# Patient Record
Sex: MALE | Race: WHITE | NOT HISPANIC OR LATINO | Employment: FULL TIME | URBAN - METROPOLITAN AREA
[De-identification: names, ages, dates, MRNs, and addresses within clinical notes are randomized per-mention and may not be internally consistent; named-entity substitution may affect disease eponyms.]

---

## 2019-12-20 ENCOUNTER — OFFICE VISIT (OUTPATIENT)
Dept: URGENT CARE | Facility: CLINIC | Age: 29
End: 2019-12-20

## 2019-12-20 VITALS
HEIGHT: 68 IN | WEIGHT: 220 LBS | TEMPERATURE: 98 F | RESPIRATION RATE: 16 BRPM | DIASTOLIC BLOOD PRESSURE: 100 MMHG | SYSTOLIC BLOOD PRESSURE: 133 MMHG | HEART RATE: 85 BPM | OXYGEN SATURATION: 98 % | BODY MASS INDEX: 33.34 KG/M2

## 2019-12-20 DIAGNOSIS — S83.92XA SPRAIN OF LEFT KNEE, UNSPECIFIED LIGAMENT, INITIAL ENCOUNTER: Primary | ICD-10-CM

## 2019-12-20 DIAGNOSIS — Z02.6 ENCOUNTER RELATED TO WORKER'S COMPENSATION CLAIM: ICD-10-CM

## 2019-12-20 DIAGNOSIS — M25.562 ACUTE PAIN OF LEFT KNEE: ICD-10-CM

## 2019-12-20 PROCEDURE — 73562 X-RAY EXAM OF KNEE 3: CPT | Mod: FY,LT,S$GLB, | Performed by: RADIOLOGY

## 2019-12-20 PROCEDURE — 73562 XR KNEE 3 VIEW LEFT: ICD-10-PCS | Mod: FY,LT,S$GLB, | Performed by: RADIOLOGY

## 2019-12-20 PROCEDURE — 99203 OFFICE O/P NEW LOW 30 MIN: CPT | Mod: S$GLB,,, | Performed by: FAMILY MEDICINE

## 2019-12-20 PROCEDURE — 99203 PR OFFICE/OUTPT VISIT, NEW, LEVL III, 30-44 MIN: ICD-10-PCS | Mod: S$GLB,,, | Performed by: FAMILY MEDICINE

## 2019-12-20 RX ORDER — IBUPROFEN 800 MG/1
800 TABLET ORAL 3 TIMES DAILY
Qty: 30 TABLET | Refills: 0 | Status: SHIPPED | OUTPATIENT
Start: 2019-12-20 | End: 2019-12-30

## 2019-12-20 NOTE — LETTER
[facility]  [address]  [place] [address]  Phone: [phone]  Fax: [phone]  [facility]: [id number]    Pt Name: Esa Ahn  Injury Date: 12/20/2019   Employee ID:  Date Treatment: 12/20/2019   Company: [employer] to record [employer]       Appointment Time: 05:55 PM Arrived:6;10 pm    Provider: Henok Morgan MD Time Out: 8:15 pm     Office Treatment:   1. Sprain of left knee, unspecified ligament, initial encounter    2. Encounter related to worker's compensation claim    3. Acute pain of left knee      Medications Ordered This Encounter   Medications    ibuprofen (ADVIL,MOTRIN) 800 MG tablet                 Return Appointment: TBD  [address]   [id number],   [place] [id number]

## 2019-12-21 NOTE — PROGRESS NOTES
Subjective:       Patient ID: Esa Ahn is a 29 y.o. male.    Chief Complaint: Knee Pain    Pt c/o fall at work today at approx. 0930. Reports pain in LT knee and leg and lower back pain.    Knee Pain    The incident occurred 6 to 12 hours ago. The incident occurred at work. The injury mechanism was a fall. The pain is present in the left knee and left leg. The quality of the pain is described as aching. The pain is at a severity of 9/10. The pain has been fluctuating since onset. Associated symptoms include tingling. Pertinent negatives include no inability to bear weight, loss of motion, loss of sensation, muscle weakness or numbness. He reports no foreign bodies present. The symptoms are aggravated by movement. He has tried nothing for the symptoms.       Constitution: Negative for chills, fatigue and fever.   HENT: Negative for congestion and sore throat.    Neck: Negative for painful lymph nodes.   Cardiovascular: Negative for chest pain and leg swelling.   Eyes: Negative for double vision and blurred vision.   Respiratory: Negative for cough and shortness of breath.    Gastrointestinal: Negative for abdominal pain, nausea, vomiting, diarrhea and bowel incontinence.   Genitourinary: Negative for dysuria, frequency, urgency, bladder incontinence and hematuria.   Musculoskeletal: Positive for joint pain and back pain. Negative for joint swelling, muscle cramps, muscle ache and history of spine disorder.   Skin: Negative for color change, pale and rash.   Allergic/Immunologic: Negative for seasonal allergies.   Neurological: Negative for dizziness, history of vertigo, light-headedness, passing out, coordination disturbances, headaches, numbness and tingling.   Hematologic/Lymphatic: Negative for swollen lymph nodes, easy bruising/bleeding and history of blood clots. Does not bruise/bleed easily.   Psychiatric/Behavioral: Negative for nervous/anxious, sleep disturbance and depression. The patient is not  nervous/anxious.         Objective:      Physical Exam   Constitutional: He is oriented to person, place, and time. He appears well-developed and well-nourished. He is cooperative.  Non-toxic appearance. He does not appear ill. No distress.   HENT:   Head: Normocephalic and atraumatic.   Right Ear: Hearing, tympanic membrane and ear canal normal.   Left Ear: Hearing, tympanic membrane and ear canal normal.   Nose: No mucosal edema, rhinorrhea or nasal deformity. No epistaxis. Right sinus exhibits no maxillary sinus tenderness and no frontal sinus tenderness. Left sinus exhibits no maxillary sinus tenderness and no frontal sinus tenderness.   Mouth/Throat: Uvula is midline and mucous membranes are normal. No trismus in the jaw. Normal dentition. No uvula swelling. No posterior oropharyngeal erythema.   Eyes: Conjunctivae and lids are normal. Right eye exhibits no discharge. Left eye exhibits no discharge. No scleral icterus.   Sclera clear bilat   Neck: Trachea normal, normal range of motion, full passive range of motion without pain and phonation normal. Neck supple.   Cardiovascular: Normal rate, regular rhythm, normal heart sounds, intact distal pulses and normal pulses.   Pulmonary/Chest: Effort normal and breath sounds normal. No respiratory distress.   Abdominal: Soft. Normal appearance and bowel sounds are normal. He exhibits no distension and no pulsatile midline mass. There is no tenderness.   Musculoskeletal:        Legs:  Left knee has tenderness at the medial and lateral collateral ligament area.  No swelling, no redness, no bruise.  left knee is stable.   Neurological: He is alert and oriented to person, place, and time. He exhibits normal muscle tone. Coordination normal.   Skin: Skin is warm, dry and intact. He is not diaphoretic. No pallor.   Psychiatric: He has a normal mood and affect. His speech is normal and behavior is normal. Judgment and thought content normal. Cognition and memory are normal.    Nursing note and vitals reviewed.      Assessment:       1. Sprain of left knee, unspecified ligament, initial encounter    2. Encounter related to worker's compensation claim    3. Acute pain of left knee        Plan:         Medications Ordered This Encounter   Medications    ibuprofen (ADVIL,MOTRIN) 800 MG tablet     Sig: Take 1 tablet (800 mg total) by mouth 3 (three) times daily. for 10 days     Dispense:  30 tablet     Refill:  0         Patient Instructions     Knee Sprain    A sprain is an injury to the ligaments or capsule that holds a joint together. There are no broken bones. Most sprains take 3 to 6 weeks to heal. If it a severe sprain where the ligament is completely torn, it can take months to recover.  Most knee sprains are treated with a splint, knee immobilizer brace, or elastic wrap for support. Severe sprains may require surgery.  Home care  · Stay off the injured leg as much as possible until you can walk on it without pain. If you have a lot of pain with walking, crutches or a walker may be prescribed. (These can be rented or purchased at many pharmacies and surgical or orthopedic supply stores). Follow your healthcare provider's advice about when to begin putting weight on that leg.  · Keep your leg elevated to reduce pain and swelling. When sleeping, place a pillow under the injured leg. When sitting, support the injured leg so it is level with your waist. This is very important during the first 48 hours.  · Apply an ice pack over the injured area for 15 to 20 minutes every 3 to 6 hours. You should do this for the first 24 to 48 hours. You can make an ice pack by filling a plastic bag that seals at the top with ice cubes and then wrapping it with a thin towel. Continue to use ice packs for relief of pain and swelling as needed. As the ice melts, be careful to avoid getting your wrap, splint, or cast wet. After 48 hours, apply heat (warm shower or warm bath) for 15 to 20 minutes several  times a day, or alternate ice and heat. You can place the ice pack directly over the splint. If you have to wear a hook-and-loop knee brace, you can open it to apply the ice pack, or heat, directly to the knee. Never put ice directly on the skin. Always wrap the ice in a towel or other type of cloth.  · You may use over-the-counter pain medicine to control pain, unless another pain medicine was prescribed.If you have chronic liver or kidney disease or ever had a stomach ulcer or GI bleeding, talk with your healthcare provider before using these medicines.  · If you were given a splint, keep it completely dry at all times. Bathe with your splint out of the water, protected with 2 large plastic bags, rubber-banded at the top end. If a fiberglass splint gets wet, you can dry it with a hair dryer. If you have a hook-and-loop knee brace, you can remove this to bathe, unless told otherwise.  Follow-up care  Follow up with your doctor as advised. Any X-rays you had today dont show any broken bones, breaks, or fractures. Sometimes fractures dont show up on the first X-ray. Bruises and sprains can sometimes hurt as much as a fracture. These injuries can take time to heal completely. If your symptoms dont improve or they get worse, talk with your doctor. You may need a repeat X-ray. If X-rays were taken, you will be told of any new findings that may affect your care.  Call 911  Call 911 if you have:  ·  Shortness of breath  ·  Chest pain  When to seek medical advice  Call your healthcare provider right away if any of these occur:  · The splint or knee immobilizer brace becomes wet or soft  · The fiberglass cast or splint remains wet for more than 24 hours  · Pain or swelling increases  · The injured leg or toes become cold, blue, numb, or tingly  Date Last Reviewed: 11/20/2015  © 8986-1372 [employer]. [address] [place] [address], [place] [address]. All rights reserved. This information is not intended as a  substitute for professional medical care. Always follow your healthcare professional's instructions.      Follow up with [facility] [address] on Monday (12/23).    Henok Morgan MD         No follow-ups on file.

## 2019-12-21 NOTE — PATIENT INSTRUCTIONS
Knee Sprain    A sprain is an injury to the ligaments or capsule that holds a joint together. There are no broken bones. Most sprains take 3 to 6 weeks to heal. If it a severe sprain where the ligament is completely torn, it can take months to recover.  Most knee sprains are treated with a splint, knee immobilizer brace, or elastic wrap for support. Severe sprains may require surgery.  Home care  · Stay off the injured leg as much as possible until you can walk on it without pain. If you have a lot of pain with walking, crutches or a walker may be prescribed. (These can be rented or purchased at many pharmacies and surgical or orthopedic supply stores). Follow your healthcare provider's advice about when to begin putting weight on that leg.  · Keep your leg elevated to reduce pain and swelling. When sleeping, place a pillow under the injured leg. When sitting, support the injured leg so it is level with your waist. This is very important during the first 48 hours.  · Apply an ice pack over the injured area for 15 to 20 minutes every 3 to 6 hours. You should do this for the first 24 to 48 hours. You can make an ice pack by filling a plastic bag that seals at the top with ice cubes and then wrapping it with a thin towel. Continue to use ice packs for relief of pain and swelling as needed. As the ice melts, be careful to avoid getting your wrap, splint, or cast wet. After 48 hours, apply heat (warm shower or warm bath) for 15 to 20 minutes several times a day, or alternate ice and heat. You can place the ice pack directly over the splint. If you have to wear a hook-and-loop knee brace, you can open it to apply the ice pack, or heat, directly to the knee. Never put ice directly on the skin. Always wrap the ice in a towel or other type of cloth.  · You may use over-the-counter pain medicine to control pain, unless another pain medicine was prescribed.If you have chronic liver or kidney disease or ever had a stomach  ulcer or GI bleeding, talk with your healthcare provider before using these medicines.  · If you were given a splint, keep it completely dry at all times. Bathe with your splint out of the water, protected with 2 large plastic bags, rubber-banded at the top end. If a fiberglass splint gets wet, you can dry it with a hair dryer. If you have a hook-and-loop knee brace, you can remove this to bathe, unless told otherwise.  Follow-up care  Follow up with your doctor as advised. Any X-rays you had today dont show any broken bones, breaks, or fractures. Sometimes fractures dont show up on the first X-ray. Bruises and sprains can sometimes hurt as much as a fracture. These injuries can take time to heal completely. If your symptoms dont improve or they get worse, talk with your doctor. You may need a repeat X-ray. If X-rays were taken, you will be told of any new findings that may affect your care.  Call 911  Call 911 if you have:  ·  Shortness of breath  ·  Chest pain  When to seek medical advice  Call your healthcare provider right away if any of these occur:  · The splint or knee immobilizer brace becomes wet or soft  · The fiberglass cast or splint remains wet for more than 24 hours  · Pain or swelling increases  · The injured leg or toes become cold, blue, numb, or tingly  Date Last Reviewed: 11/20/2015  © 4032-8651 [employer]. [address] [place] [address]. All rights reserved. This information is not intended as a substitute for professional medical care. Always follow your healthcare professional's instructions.      Follow up with [facility] on [address] on Monday (12/23).    Henok Morgan MD

## 2019-12-23 ENCOUNTER — OFFICE VISIT (OUTPATIENT)
Dept: URGENT CARE | Facility: CLINIC | Age: 29
End: 2019-12-23

## 2019-12-23 VITALS
DIASTOLIC BLOOD PRESSURE: 92 MMHG | RESPIRATION RATE: 18 BRPM | TEMPERATURE: 98 F | WEIGHT: 220 LBS | BODY MASS INDEX: 33.34 KG/M2 | HEART RATE: 92 BPM | SYSTOLIC BLOOD PRESSURE: 120 MMHG | HEIGHT: 68 IN

## 2019-12-23 DIAGNOSIS — S30.0XXA CONTUSION OF LOWER BACK, INITIAL ENCOUNTER: ICD-10-CM

## 2019-12-23 DIAGNOSIS — M54.50 ACUTE MIDLINE LOW BACK PAIN WITHOUT SCIATICA: ICD-10-CM

## 2019-12-23 DIAGNOSIS — S83.92XA SPRAIN OF LEFT KNEE, UNSPECIFIED LIGAMENT, INITIAL ENCOUNTER: Primary | ICD-10-CM

## 2019-12-23 DIAGNOSIS — M25.562 ACUTE PAIN OF LEFT KNEE: ICD-10-CM
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
## 2019-12-23 PROCEDURE — 72100 X-RAY EXAM L-S SPINE 2/3 VWS: CPT | Mod: FY,S$GLB,, | Performed by: RADIOLOGY

## 2019-12-23 PROCEDURE — 99203 OFFICE O/P NEW LOW 30 MIN: CPT | Mod: S$GLB,,, | Performed by: PHYSICIAN ASSISTANT

## 2019-12-23 PROCEDURE — 72100 XR LUMBAR SPINE 2 OR 3 VIEWS: ICD-10-PCS | Mod: FY,S$GLB,, | Performed by: RADIOLOGY

## 2019-12-23 PROCEDURE — 99203 PR OFFICE/OUTPT VISIT, NEW, LEVL III, 30-44 MIN: ICD-10-PCS | Mod: S$GLB,,, | Performed by: PHYSICIAN ASSISTANT

## 2019-12-23 RX ORDER — HYDROCODONE BITARTRATE AND ACETAMINOPHEN 5; 325 MG/1; MG/1
1 TABLET ORAL EVERY 8 HOURS PRN
Qty: 12 TABLET | Refills: 0 | Status: SHIPPED | OUTPATIENT
Start: 2019-12-23 | End: 2023-12-05 | Stop reason: ALTCHOICE

## 2019-12-23 NOTE — LETTER
[facility]  [address]  [place] [address]  Phone: [phone]  Fax: [phone]  [facility] [facility]: [id number]    Pt Name: Devendra Ahn  Injury Date: 12/20/2019   Employee ID: [id number] Date of First Treatment: 12/23/2019   Company: [employer]       Appointment Time: 11:15 AM Arrived:11:28 AM   Provider: Hipolito Dozier PA-C Time Out:1:40 PM     Office Treatment:   EXAM  X-RAYS  PRESCRIPTION GIVEN  DISABLED    1. Sprain of left knee, unspecified ligament, initial encounter    2. Acute pain of left knee    3. Acute midline low back pain without sciatica    4. Contusion of lower back, initial encounter    5. Work related injury      Medications Ordered This Encounter   Medications    HYDROcodone-acetaminophen (NORCO) 5-325 mg per tablet      Patient Instructions: Use splint as directed, Use Crutches as directed, Elevated affected area(Continue Ibuprofen 800mg 3 times daily. Ice and elevate knee often. Advance to full weight bearing as tolerated. )       Restrictions: Disabled until next office visit(Excuse Devendra for the past few days (12/21, 12/22) due to injury. Unable to work at this time. )     Return Appointment: 12/30/2019 at 1:00 PM  [facility]

## 2019-12-23 NOTE — PROGRESS NOTES
Subjective:       Patient ID: Devendra Ahn is a 29 y.o. male.    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 He stepped up to the metal platform and his left leg twisted and went under his back. He fell to the right onto a metal platform injuring his lower back. Patient states he was asked if he wanted to go to the ER and he declined due to the fact that he did not have insurance, he made management aware.  Patient states he was made to work 6 more hours by a member of management and was not instructed that he could be seen at Occupational [facility] and that it would be covered by Workers comp. He was not informed this was a workers comp issue. He continued to work and slipped and fell again, this time in the pit. However, he states he ended up going to [facility] after his shift on 12/20/2019. He had x-rays and was diagnosed with a knee sprain. He states the company would not accept the paperwork from [facility]. He was finally directed to come to [facility] [facility] and states he needs an MRI per the provider at Urgent Care. Patient feels mistreated by company.     Left knee- Swollen and constant throbbing pain, he has a knee brace applied and is on crutches. Pain level 8/10 on today.  Lower back- pain in the lower back and right side of back has sharp pain. Pain level 8/10 on today.  Patient is taking Ibuprofen 800 mg as needed. [place]    Knee Injury   This is a new problem. The current episode started in the past 7 days. The problem occurs constantly. The problem has been rapidly worsening. Associated symptoms include arthralgias and joint swelling. Pertinent negatives include no abdominal pain, anorexia, change in bowel habit, chest pain, chills, congestion, coughing, diaphoresis, fatigue, fever, headaches, myalgias, nausea, neck pain, numbness,  rash, sore throat, swollen glands, urinary symptoms, vertigo, visual change, vomiting or weakness. The symptoms are aggravated by standing, twisting, walking and bending. He has tried NSAIDs for the symptoms. The treatment provided mild relief.   Back Pain   This is a new problem. The current episode started in the past 7 days. The problem occurs constantly. The problem has been gradually worsening since onset. The pain is present in the lumbar spine. The quality of the pain is described as shooting (sharp). The pain radiates to the right thigh. The pain is at a severity of 8/10. The pain is the same all the time. The symptoms are aggravated by sitting. Stiffness is present all day. Pertinent negatives include no abdominal pain, bladder incontinence, bowel incontinence, chest pain, dysuria, fever, headaches, leg pain, numbness, paresis, paresthesias, pelvic pain, perianal numbness, tingling, weakness or weight loss. He has tried NSAIDs for the symptoms. The treatment provided mild relief.       Constitution: Negative for chills, sweating, fatigue and fever.   HENT: Negative for facial trauma, congestion and sore throat.    Neck: Negative for neck pain and neck stiffness.   Cardiovascular: Negative for chest pain.   Respiratory: Negative for cough.    Gastrointestinal: Negative for abdominal pain, nausea, vomiting and bowel incontinence.   Genitourinary: Negative for dysuria, bladder incontinence and pelvic pain.   Musculoskeletal: Positive for pain, trauma, joint pain, joint swelling, back pain and pain with walking. Negative for muscle ache.   Skin: Negative for rash, wound, abrasion and laceration.   Neurological: Negative for history of vertigo, headaches, numbness and tingling.        Objective:      Physical Exam   Constitutional: He appears well-developed and well-nourished. He is active. No distress.   HENT:   Head: Normocephalic and atraumatic.   Right Ear: Hearing and external ear normal.   Left Ear:  Hearing and external ear normal.   Nose: Nose normal. No nasal deformity. No epistaxis.   Mouth/Throat: Oropharynx is clear and moist and mucous membranes are normal.   Eyes: Conjunctivae and lids are normal. No scleral icterus.   Neck: Trachea normal and normal range of motion.   Cardiovascular: Intact distal pulses and normal pulses.   Pulmonary/Chest: Effort normal. No stridor. No respiratory distress.   Musculoskeletal:        Left knee: He exhibits decreased range of motion (AROM: flexion to 80 degrees, extension to 5 degrees), swelling, effusion (mild) and ecchymosis (medial aspect). He exhibits no deformity. Tenderness found. MCL tenderness noted. No patellar tendon tenderness noted.        Lumbar back: He exhibits decreased range of motion and tenderness. He exhibits no swelling, no edema and no deformity.        Back:    Neurological: He is alert. He has normal strength. He is not disoriented. No sensory deficit. GCS eye subscore is 4. GCS verbal subscore is 5. GCS motor subscore is 6.   Skin: Skin is warm, dry and intact. Capillary refill takes less than 2 seconds. He is not diaphoretic.   Psychiatric: He has a normal mood and affect. His speech is normal and behavior is normal. He is attentive.   Nursing note and vitals reviewed.        X-ray Knee 3 View Left    Result Date: 12/20/2019  EXAMINATION: XR KNEE 3 VIEW LEFT CLINICAL HISTORY: Pain in left knee TECHNIQUE: AP, lateral, and Merchant views of the left knee were performed. COMPARISON: None FINDINGS: Bones are well mineralized. Overall alignment is within normal limits. No displaced fracture, dislocation or destructive osseous process. Joint spaces appear relatively maintained. No subcutaneous emphysema or radiodense retained foreign body.     No acute displaced fracture-dislocation identified. Electronically signed by: Edgar Diez MD Date:    12/20/2019 Time:    19:46    X-ray Lumbar Spine 2 Or 3 Views    Result Date: 12/23/2019  EXAMINATION: XR  LUMBAR SPINE 2 OR 3 VIEWS CLINICAL HISTORY: Low back pain, <6wks, no red flags, no prior management;WORKERS COMP;  Low back pain TECHNIQUE: Three views lumbar spine COMPARISON: None FINDINGS: The lumbosacral disc space is slightly narrowed.  The other disc spaces are well maintained.  No fracture, spondylolisthesis or bone destruction identified see above Electronically signed by: Naveed Judd MD Date:    12/23/2019 Time:    13:04    Assessment:       1. Sprain of left knee, unspecified ligament, initial encounter    2. Acute pain of left knee    3. Acute midline low back pain without sciatica    4. Contusion of lower back, initial encounter    5. Work related injury        Plan:         Unable to perform a good knee exam due to pain and guarding. Consider MRI or PT if no significant improvement at next office visit.     Medications Ordered This Encounter   Medications    HYDROcodone-acetaminophen (NORCO) 5-325 mg per tablet     Sig: Take 1 tablet by mouth every 8 (eight) hours as needed for Pain (Take off duty only.).     Dispense:  12 tablet     Refill:  0     Quantity prescribed more than 7 day supply? No     Patient Instructions: Use splint as directed, Use Crutches as directed, Elevated affected area(Continue Ibuprofen 800mg 3 times daily. Ice and elevate knee often. Advance to full weight bearing as tolerated. )   Restrictions: Disabled until next office visit(Excuse Devendra for the past few days (12/21, 12/22) due to injury. Unable to work at this time. )  Follow up in about 1 week (around 12/30/2019).

## 2019-12-30 ENCOUNTER — OFFICE VISIT (OUTPATIENT)
Dept: URGENT CARE | Facility: CLINIC | Age: 29
End: 2019-12-30

## 2019-12-30 DIAGNOSIS — S83.92XD SPRAIN OF LEFT KNEE, UNSPECIFIED LIGAMENT, SUBSEQUENT ENCOUNTER: Primary | ICD-10-CM

## 2019-12-30 DIAGNOSIS — M54.50 ACUTE MIDLINE LOW BACK PAIN WITHOUT SCIATICA: ICD-10-CM

## 2019-12-30 DIAGNOSIS — S30.0XXD CONTUSION OF LOWER BACK, SUBSEQUENT ENCOUNTER: ICD-10-CM

## 2019-12-30 DIAGNOSIS — M25.562 ACUTE PAIN OF LEFT KNEE: ICD-10-CM
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
## 2019-12-30 PROCEDURE — 99214 PR OFFICE/OUTPT VISIT, EST, LEVL IV, 30-39 MIN: ICD-10-PCS | Mod: S$GLB,,, | Performed by: NURSE PRACTITIONER

## 2019-12-30 PROCEDURE — 99214 OFFICE O/P EST MOD 30 MIN: CPT | Mod: S$GLB,,, | Performed by: NURSE PRACTITIONER

## 2019-12-30 NOTE — LETTER
[facility]  [address]  [place] [address]  Phone: [phone]  Fax: [phone]  [facility]: [id number]    Pt Name: Devendra Ahn  Injury Date: 12/20/2019   Employee ID:[id number] Date of  Treatment: 12/30/2019   Company:  [employer]      Appointment Time:0:1:00pm Arrived: 1:50pm   Provider: [facility] Time Out:3:20pm     Office Treatment:     1. Sprain of left knee, unspecified ligament, subsequent encounter    2. Acute pain of left knee    3. Acute midline low back pain without sciatica    4. Contusion of lower back, subsequent encounter    5. Work related injury          Patient Instructions: Attention not to aggravate affected area, Daily home exercises/warm soaks, Use splint as directed    Restrictions: Home today, No lifting/pushing/pulling more than 10 lbs, Avoid climbing/kneeling/squatting, Sit or stand as needed, Sedentary work only     Return Appointment: 1/7/2020 at 9:00am  IGNACIO

## 2019-12-30 NOTE — PROGRESS NOTES
Subjective:       Patient ID: Devendra Ahn is a 29 y.o. male.    Chief Complaint: Knee Injury (Left 12/20/19) and Back Pain (12/20/19)    Pt is a f/u for left knee 5/10 and back pain 7/10  from 12/20/19. Currently Disabled, meds and knee brace helping, pain increases in the left knee when he does not wear the knee brace.  No longer using crutches, soreness on the right side is gone and pain increases in the lower back when he bends over.MB  Did not get Rx filled for Hydorcodone because it cost too much and the company told him they wouldn't pay for it. Has been taking his mother's Ibuprofen. Weaned off crutches this past weekend. Knee is feeling better ( #4-6 on pain scale). Swelling has decreased. Back continues to hurt. MWT    Knee Injury   This is a recurrent problem. The current episode started 1 to 4 weeks ago. The problem occurs daily. The problem has been gradually improving. Associated symptoms include arthralgias and joint swelling. Pertinent negatives include no abdominal pain, chest pain, chills, congestion, coughing, fatigue, fever, headaches, myalgias, nausea, rash, sore throat, vertigo or vomiting. The symptoms are aggravated by bending and exertion. He has tried acetaminophen, NSAIDs and oral narcotics for the symptoms. The treatment provided mild relief.   Back Pain   This is a recurrent problem. The current episode started 1 to 4 weeks ago. The problem occurs daily. The problem has been gradually improving since onset. The pain is present in the lumbar spine. The quality of the pain is described as aching. The pain does not radiate. The pain is at a severity of 7/10. The pain is moderate. The symptoms are aggravated by position. Pertinent negatives include no abdominal pain, bladder incontinence, bowel incontinence, chest pain, dysuria, fever or headaches. He has tried NSAIDs for the symptoms. The treatment provided mild relief.       Constitution: Negative for chills, fatigue and fever.   HENT:  Negative for congestion and sore throat.    Neck: Negative for painful lymph nodes.   Cardiovascular: Negative for chest pain and leg swelling.   Eyes: Negative for double vision and blurred vision.   Respiratory: Negative for cough and shortness of breath.    Gastrointestinal: Negative for abdominal pain, nausea, vomiting, diarrhea and bowel incontinence.   Genitourinary: Negative for dysuria, frequency, urgency and bladder incontinence.   Musculoskeletal: Positive for pain, trauma, joint pain, joint swelling and back pain. Negative for muscle cramps and muscle ache.   Skin: Negative for color change, pale, rash and erythema.   Allergic/Immunologic: Negative for seasonal allergies.   Neurological: Negative for dizziness, history of vertigo, light-headedness, passing out and headaches.   Hematologic/Lymphatic: Negative for swollen lymph nodes, easy bruising/bleeding and history of blood clots. Does not bruise/bleed easily.   Psychiatric/Behavioral: Negative for nervous/anxious, sleep disturbance and depression. The patient is not nervous/anxious.         Objective:      Physical Exam   Constitutional: He is oriented to person, place, and time. He appears well-developed and well-nourished. No distress.   HENT:   Right Ear: External ear normal.   Left Ear: External ear normal.   Nose: Nose normal.   Eyes: Conjunctivae are normal.   Cardiovascular: Intact distal pulses.   Pulmonary/Chest: Effort normal.   Musculoskeletal: He exhibits tenderness.        Left knee: He exhibits decreased range of motion and ecchymosis. He exhibits no swelling, no effusion, no erythema, normal alignment and no LCL laxity. Tenderness found. Medial joint line tenderness noted.        Lumbar back: He exhibits decreased range of motion, tenderness and pain. He exhibits normal pulse.        Back:         Legs:  Mild ecchymosis noted to medial aspect L knee and posterior R thigh. Pain with extension & flexion of L knee. TTP over medial aspect.  Pain with Riaz's. Neg anterior & posterior drawer signs.  Pain to R lower back with flexion, less pain with extension and lateral bending. Neg SLRs bilaterally. NV intact distally.     Neurological: He is alert and oriented to person, place, and time.   Skin: Skin is warm and dry. No erythema.   Psychiatric: He has a normal mood and affect. His behavior is normal. Judgment and thought content normal.       Assessment:       1. Sprain of left knee, unspecified ligament, subsequent encounter    2. Acute pain of left knee    3. Acute midline low back pain without sciatica    4. Contusion of lower back, subsequent encounter    5. Work related injury        Plan:     Since patient seems to be doing a lot better this week, I have not ordered PT. Demonstrated home exercises and handouts given with home exercises. I do not think a MRI is warranted at this time. He will follow up next week.  Also, told pt to check with his HR department regarding reimbursement for Rx.       Patient Instructions: Attention not to aggravate affected area, Daily home exercises/warm soaks, Use splint as directed   Restrictions: Home today, No lifting/pushing/pulling more than 10 lbs, Avoid climbing/kneeling/squatting, Sit or stand as needed, Sedentary work only  Follow up in about 8 days (around 1/7/2020).

## 2020-01-07 ENCOUNTER — OFFICE VISIT (OUTPATIENT)
Dept: URGENT CARE | Facility: CLINIC | Age: 30
End: 2020-01-07

## 2020-01-07 DIAGNOSIS — S30.0XXD CONTUSION OF LOWER BACK, SUBSEQUENT ENCOUNTER: ICD-10-CM

## 2020-01-07 DIAGNOSIS — S83.92XD SPRAIN OF LEFT KNEE, UNSPECIFIED LIGAMENT, SUBSEQUENT ENCOUNTER: Primary | ICD-10-CM
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
## 2020-01-07 PROCEDURE — 99214 PR OFFICE/OUTPT VISIT, EST, LEVL IV, 30-39 MIN: ICD-10-PCS | Mod: S$GLB,,, | Performed by: PHYSICIAN ASSISTANT

## 2020-01-07 PROCEDURE — 99214 OFFICE O/P EST MOD 30 MIN: CPT | Mod: S$GLB,,, | Performed by: PHYSICIAN ASSISTANT

## 2020-01-07 NOTE — LETTER
[facility]  [address], [address]  [place] [address]  Phone: [phone]  Fax: [phone]  [facility]: [email or web address]    Pt Name: Devendra Ahn  Injury Date: 12/20/2019   Employee ID:[id number] Date of  Treatment: 01/07/2020   Company:  [employer]      Appointment Time: 08:45 AM Arrived: 1:58pm   Provider: Hipolito Dozier PA-C Time Out:3:30pm     Office Treatment:     1. Sprain of left knee, unspecified ligament, subsequent encounter    2. Contusion of lower back, subsequent encounter    3. Work related injury          Patient Instructions: Daily home exercises/warm soaks(Ibuprofen 200 mg  take 2 tablets by mouth every 4-6 hours as needed.)    Restrictions: Regular Duty, Discharged from Occupational Health     Return Appointment:None  LN

## 2020-01-07 NOTE — PROGRESS NOTES
Subjective:       Patient ID: Devendra Ahn is a 29 y.o. male.    Chief Complaint: Back Pain and Knee Injury (Left)    Pt is being seen today for a follow up of his left knee and back injury from 12/20/19.  His pain level today for his left knee is 0/10.  His pain level today for his back is 4/10 with occasional sharp pain to a 7/10.  Pt denies any pain radiating.  KD      Constitution: Negative for fatigue.   HENT: Negative for facial swelling and facial trauma.    Neck: Negative for neck stiffness.   Cardiovascular: Negative for chest trauma.   Eyes: Negative for eye trauma, double vision and blurred vision.   Gastrointestinal: Negative for abdominal trauma, abdominal pain and rectal bleeding.   Genitourinary: Negative for hematuria, genital trauma and pelvic pain.   Musculoskeletal: Positive for pain, trauma and back pain. Negative for joint pain, joint swelling, abnormal ROM of joint and pain with walking.   Skin: Negative for color change, wound, abrasion and laceration.   Neurological: Negative for dizziness, history of vertigo, light-headedness, coordination disturbances, altered mental status and loss of consciousness.   Hematologic/Lymphatic: Negative for history of bleeding disorder.   Psychiatric/Behavioral: Negative for altered mental status.        Objective:      Physical Exam   Constitutional: He appears well-developed and well-nourished. He is active. No distress.   HENT:   Head: Normocephalic and atraumatic.   Right Ear: Hearing and external ear normal.   Left Ear: Hearing and external ear normal.   Nose: Nose normal. No nasal deformity. No epistaxis.   Mouth/Throat: Oropharynx is clear and moist and mucous membranes are normal.   Eyes: Conjunctivae and lids are normal. Right conjunctiva is not injected. Left conjunctiva is not injected.   Neck: Trachea normal and normal range of motion. No spinous process tenderness and no muscular tenderness present.   Cardiovascular: Intact distal pulses and normal  pulses.   Pulses:       Dorsalis pedis pulses are 2+ on the right side, and 2+ on the left side.        Posterior tibial pulses are 2+ on the right side, and 2+ on the left side.   Pulmonary/Chest: Effort normal. No stridor. No respiratory distress.   Abdominal: Normal appearance.   Musculoskeletal:        Cervical back: Normal.        Thoracic back: Normal.        Lumbar back: Normal. He exhibits normal range of motion, no tenderness, no deformity and normal pulse.   Neurological: He is alert. He has normal strength. No sensory deficit. GCS eye subscore is 4. GCS verbal subscore is 5. GCS motor subscore is 6.   Skin: Skin is warm, dry and intact. No abrasion and no bruising noted.   Psychiatric: He has a normal mood and affect. His speech is normal and behavior is normal. Thought content normal. Cognition and memory are normal. He is attentive.   Nursing note reviewed.      Assessment:       1. Sprain of left knee, unspecified ligament, subsequent encounter    2. Contusion of lower back, subsequent encounter    3. Work related injury        Plan:         Patient reports doing much better with applying heat and ibuprofen.  Knee pain has resolved.  Patient has mild low back pain with occasional sharp pains that are non limiting.  Patient feels he is ready to go back to work   Without restriction     Patient Instructions: Daily home exercises/warm soaks(Ibuprofen 200 mg  take 2 tablets by mouth every 4-6 hours as needed.)   Restrictions: Regular Duty, Discharged from Occupational Health  Follow up if symptoms worsen or fail to improve.

## 2021-07-26 ENCOUNTER — HOSPITAL ENCOUNTER (EMERGENCY)
Facility: HOSPITAL | Age: 31
Discharge: HOME | End: 2021-07-26
Attending: EMERGENCY MEDICINE
Payer: COMMERCIAL

## 2021-07-26 VITALS
HEIGHT: 68 IN | HEART RATE: 67 BPM | BODY MASS INDEX: 33.34 KG/M2 | WEIGHT: 220 LBS | DIASTOLIC BLOOD PRESSURE: 89 MMHG | SYSTOLIC BLOOD PRESSURE: 133 MMHG | RESPIRATION RATE: 18 BRPM | TEMPERATURE: 97 F | OXYGEN SATURATION: 99 %

## 2021-07-26 DIAGNOSIS — Z20.822 CLOSE EXPOSURE TO COVID-19 VIRUS: Primary | ICD-10-CM

## 2021-07-26 LAB
CTP QC/QA: YES
SARS-COV-2 RDRP RESP QL NAA+PROBE: NEGATIVE

## 2021-07-26 PROCEDURE — 99282 EMERGENCY DEPT VISIT SF MDM: CPT

## 2021-07-26 PROCEDURE — U0002 COVID-19 LAB TEST NON-CDC: HCPCS | Performed by: NURSE PRACTITIONER

## 2023-09-02 ENCOUNTER — HOSPITAL ENCOUNTER (EMERGENCY)
Facility: HOSPITAL | Age: 33
Discharge: HOME | End: 2023-09-02
Attending: EMERGENCY MEDICINE
Payer: COMMERCIAL

## 2023-09-02 VITALS
OXYGEN SATURATION: 100 % | SYSTOLIC BLOOD PRESSURE: 136 MMHG | HEART RATE: 85 BPM | HEIGHT: 68 IN | WEIGHT: 240 LBS | TEMPERATURE: 98 F | DIASTOLIC BLOOD PRESSURE: 99 MMHG | RESPIRATION RATE: 18 BRPM | BODY MASS INDEX: 36.37 KG/M2

## 2023-09-02 DIAGNOSIS — L02.419 AXILLARY ABSCESS: Primary | ICD-10-CM

## 2023-09-02 PROBLEM — F31.9 BIPOLAR 1 DISORDER: Status: ACTIVE | Noted: 2022-02-23

## 2023-09-02 PROBLEM — E03.8 OTHER SPECIFIED HYPOTHYROIDISM: Status: ACTIVE | Noted: 2022-06-08

## 2023-09-02 PROCEDURE — 25000003 PHARM REV CODE 250: Performed by: PHYSICIAN ASSISTANT

## 2023-09-02 PROCEDURE — 96372 THER/PROPH/DIAG INJ SC/IM: CPT | Mod: 59 | Performed by: PHYSICIAN ASSISTANT

## 2023-09-02 PROCEDURE — 10060 I&D ABSCESS SIMPLE/SINGLE: CPT

## 2023-09-02 PROCEDURE — 63600175 PHARM REV CODE 636 W HCPCS: Performed by: PHYSICIAN ASSISTANT

## 2023-09-02 PROCEDURE — 99284 EMERGENCY DEPT VISIT MOD MDM: CPT | Mod: 25

## 2023-09-02 RX ORDER — HYDROCODONE BITARTRATE AND ACETAMINOPHEN 5; 325 MG/1; MG/1
1 TABLET ORAL
Status: COMPLETED | OUTPATIENT
Start: 2023-09-02 | End: 2023-09-02

## 2023-09-02 RX ORDER — IBUPROFEN 600 MG/1
600 TABLET ORAL
Status: COMPLETED | OUTPATIENT
Start: 2023-09-02 | End: 2023-09-02

## 2023-09-02 RX ORDER — SULFAMETHOXAZOLE AND TRIMETHOPRIM 800; 160 MG/1; MG/1
1 TABLET ORAL
Status: COMPLETED | OUTPATIENT
Start: 2023-09-02 | End: 2023-09-02

## 2023-09-02 RX ORDER — IBUPROFEN 600 MG/1
600 TABLET ORAL EVERY 6 HOURS PRN
COMMUNITY
Start: 2023-06-20

## 2023-09-02 RX ORDER — FENTANYL CITRATE 50 UG/ML
100 INJECTION, SOLUTION INTRAMUSCULAR; INTRAVENOUS
Status: COMPLETED | OUTPATIENT
Start: 2023-09-02 | End: 2023-09-02

## 2023-09-02 RX ORDER — LIDOCAINE HYDROCHLORIDE 10 MG/ML
10 INJECTION INFILTRATION; PERINEURAL
Status: COMPLETED | OUTPATIENT
Start: 2023-09-02 | End: 2023-09-02

## 2023-09-02 RX ORDER — LEVOTHYROXINE SODIUM 175 UG/1
175 TABLET ORAL EVERY MORNING
COMMUNITY
Start: 2023-08-07

## 2023-09-02 RX ORDER — SULFAMETHOXAZOLE AND TRIMETHOPRIM 800; 160 MG/1; MG/1
2 TABLET ORAL 2 TIMES DAILY
Qty: 28 TABLET | Refills: 0 | Status: SHIPPED | OUTPATIENT
Start: 2023-09-02 | End: 2023-09-09

## 2023-09-02 RX ORDER — NAPROXEN 500 MG/1
500 TABLET ORAL 2 TIMES DAILY PRN
Qty: 20 TABLET | Refills: 0 | Status: SHIPPED | OUTPATIENT
Start: 2023-09-02 | End: 2023-12-05 | Stop reason: ALTCHOICE

## 2023-09-02 RX ADMIN — HYDROCODONE BITARTRATE AND ACETAMINOPHEN 1 TABLET: 5; 325 TABLET ORAL at 05:09

## 2023-09-02 RX ADMIN — IBUPROFEN 600 MG: 600 TABLET ORAL at 04:09

## 2023-09-02 RX ADMIN — FENTANYL CITRATE 100 MCG: 50 INJECTION INTRAMUSCULAR; INTRAVENOUS at 05:09

## 2023-09-02 RX ADMIN — SULFAMETHOXAZOLE AND TRIMETHOPRIM 1 TABLET: 800; 160 TABLET ORAL at 04:09

## 2023-09-02 RX ADMIN — LIDOCAINE HYDROCHLORIDE 10 ML: 10 INJECTION, SOLUTION INFILTRATION; PERINEURAL at 04:09

## 2023-09-02 NOTE — ED PROVIDER NOTES
Encounter Date: 9/2/2023       History     Chief Complaint   Patient presents with    Abscess     Left armpit abscess     33-year-old male with bipolar disorder and hypothyroidism presents for a painful, swollen nodule in his right axilla.  He notes he has felt a small nodule for 2 months but over the past 4 5 days it grew and is not significantly more painful.  He denies any obvious inciting factor.  No fever or chills.  No prior similar episodes.      Review of patient's allergies indicates:  No Known Allergies  Past Medical History:   Diagnosis Date    Bipolar disorder, unspecified     Hypothyroidism, unspecified      History reviewed. No pertinent surgical history.  Family History   Problem Relation Age of Onset    No Known Problems Mother     No Known Problems Father      Social History     Tobacco Use    Smoking status: Never     Review of Systems    Physical Exam     Initial Vitals [09/02/23 1518]   BP Pulse Resp Temp SpO2   (!) 136/99 85 16 98.2 °F (36.8 °C) 100 %      MAP       --         Physical Exam    Nursing note and vitals reviewed.  Constitutional: He appears well-developed and well-nourished. He is not diaphoretic. No distress.   HENT:   Head: Normocephalic and atraumatic.   Cardiovascular:  Normal rate, regular rhythm, normal heart sounds and intact distal pulses.     Exam reveals no gallop and no friction rub.       No murmur heard.  Pulmonary/Chest: Breath sounds normal. No respiratory distress. He has no wheezes. He has no rhonchi. He has no rales. He exhibits no tenderness.   Musculoskeletal:         General: Normal range of motion.     Neurological: He is alert.   Skin: Skin is warm and dry. Abscess noted.   Tender right axillary abscess   Psychiatric: He has a normal mood and affect.         ED Course   I & D - Incision and Drainage    Date/Time: 9/2/2023 5:37 PM  Location procedure was performed: [facility] EMERGENCY DEPARTMENT    Performed by: Tressa Enrique PA-C  Authorized by: Viviana  Edilberto HARVEY MD  Consent Done: Yes  Type: abscess  Body area: upper extremity  Anesthesia: local infiltration    Anesthesia:  Local Anesthetic: lidocaine 1% without epinephrine  Anesthetic total: 7 mL    Patient sedated: no  Risk factor: underlying major vessel  Scalpel size: 11  Incision type: single straight  Drainage: purulent and bloody  Drainage amount: copious  Wound treatment: incision, drainage, deloculation, expression of material and sebum removal  Patient tolerance: Patient tolerated the procedure well with no immediate complications        Labs Reviewed - No data to display       Imaging Results    None          Medications   LIDOcaine HCL 10 mg/ml (1%) injection 10 mL (10 mLs Infiltration Given 9/2/23 1615)   ibuprofen tablet 600 mg (600 mg Oral Given 9/2/23 1646)   sulfamethoxazole-trimethoprim 800-160mg per tablet 1 tablet (1 tablet Oral Given 9/2/23 1646)   HYDROcodone-acetaminophen 5-325 mg per tablet 1 tablet (1 tablet Oral Given 9/2/23 1751)   fentaNYL 50 mcg/mL injection 100 mcg (100 mcg Intramuscular Given 9/2/23 1751)     Medical Decision Making  33-year-old male presenting for axillary abscess.  /99, vitals otherwise normal.  Nontoxic appearing.    Differential diagnosis:  Abscess   Cellulitis   Sebaceous cyst   I doubt hidradenitis supertiva  given no prior similar episodes    Incision and drainage performed, however patient with significant difficulty tolerating the procedure.  I was able to express significant purulent material as well as some sebum and a portion of cyst capsule.  Will treat with Bactrim and referral to General surgery for follow-up. Stressed the importance of follow-up, strict ED return precautions given.  Patient voiced understanding and is comfortable with discharge.       Risk  Prescription drug management.               ED Course as of 09/02/23 2104   Sat Sep 02, 2023   1733 Patient with difficulty tolerating incision and drainage.  Will give fentanyl, Norco,  re-attempt [CC]      ED Course User Index  [CC] Tressa Enrique PA-C                    Clinical Impression:   Final diagnoses:  [L02.419] Axillary abscess (Primary)        ED Disposition Condition    Discharge Stable          ED Prescriptions       Medication Sig Dispense Start Date End Date Auth. Provider    sulfamethoxazole-trimethoprim 800-160mg (BACTRIM DS) 800-160 mg Tab Take 2 tablets by mouth 2 (two) times daily. for 7 days 28 tablet 9/2/2023 9/9/2023 Tressa Enrique PA-C    naproxen (NAPROSYN) 500 MG tablet Take 1 tablet (500 mg total) by mouth 2 (two) times daily as needed. 20 tablet 9/2/2023 -- Tressa Enrique PA-C          Follow-up Information       Follow up With Specialties Details Why Contact Info    [facility] GENERAL SURGERY General Surgery Schedule an appointment as soon as possible for a visit in 1 week  [address]  [facility] [address]  [phone]    [facility] - Emergency Dept Emergency Medicine Go to  If symptoms worsen [address] [id number] [phone]             Tressa Enrique PA-C  09/02/23 9911

## 2023-09-02 NOTE — ED NOTES
C/C: 33 y.o. male arrived to the ED via POV for abscess. Pt reports that he noticed a miniscule bump to right axillary region x2 months. Pt states he noticed acute worsening over the course of 4-5 days. Denies fevers, chills, or abx use.    APPEARANCE: awake, alert, and appears to be in moderate discomfort. Pain score 5/10   SKIN: warm, dry and intact. +marked palpable swelling and erythema @ rt axilla - no drainage from site   MUSCULOSKELETAL: Patient moving all extremities spontaneously, no obvious swelling or deformities noted. Ambulates independently.  RESPIRATORY: Denies shortness of breath. Respirations unlabored.   CARDIAC: Denies CP, 2+ distal pulses; no peripheral edema  ABDOMEN: S/ND/NT, Denies nausea  : voids spontaneously, denies difficulty  NEUROLOGIC: AAO x 4; follows commands equal strength in all extremities; denies numbness/tingling.

## 2023-09-02 NOTE — DISCHARGE INSTRUCTIONS
Diagnosis:  Axillary abscess    You have an abscess in your armpit which I drained in the emergency department.  I think he most likely had infectious sebaceous cyst underlying this.  I am prescribing a course of antibiotics to help clear the remainder of the infection as well as medication you can take as needed for pain.  This may continue to drain pus and blood, this is normal.    However, unfortunately I was not able to drain all of the fluid from the abscess.  I sent a referral to our general surgery doctors for follow-up, please call to schedule follow-up appointment.  If you start have any worsening symptoms, or new symptoms such as fever, please return to the emergency department.

## 2023-09-05 ENCOUNTER — TELEPHONE (OUTPATIENT)
Dept: SURGERY | Facility: CLINIC | Age: 33
End: 2023-09-05
Payer: COMMERCIAL

## 2023-09-15 ENCOUNTER — TELEPHONE (OUTPATIENT)
Dept: SURGERY | Facility: CLINIC | Age: 33
End: 2023-09-15
Payer: COMMERCIAL

## 2023-11-20 ENCOUNTER — OFFICE VISIT (OUTPATIENT)
Dept: URGENT CARE | Facility: CLINIC | Age: 33
End: 2023-11-20
Payer: OTHER MISCELLANEOUS

## 2023-11-20 ENCOUNTER — TELEPHONE (OUTPATIENT)
Dept: URGENT CARE | Facility: CLINIC | Age: 33
End: 2023-11-20
Payer: COMMERCIAL

## 2023-11-20 VITALS
HEIGHT: 68 IN | BODY MASS INDEX: 36.37 KG/M2 | OXYGEN SATURATION: 97 % | TEMPERATURE: 99 F | HEART RATE: 78 BPM | WEIGHT: 240 LBS | DIASTOLIC BLOOD PRESSURE: 97 MMHG | SYSTOLIC BLOOD PRESSURE: 133 MMHG | RESPIRATION RATE: 20 BRPM
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
## 2023-11-20 DIAGNOSIS — S93.401A SPRAIN OF RIGHT ANKLE, UNSPECIFIED LIGAMENT, INITIAL ENCOUNTER: Primary | ICD-10-CM

## 2023-11-20 DIAGNOSIS — S99.911A RIGHT ANKLE INJURY, INITIAL ENCOUNTER: ICD-10-CM

## 2023-11-20 DIAGNOSIS — S99.921A RIGHT FOOT INJURY, INITIAL ENCOUNTER: ICD-10-CM

## 2023-11-20 DIAGNOSIS — S93.401A SPRAIN OF RIGHT ANKLE, UNSPECIFIED LIGAMENT, INITIAL ENCOUNTER: ICD-10-CM

## 2023-11-20 PROCEDURE — 73610 XR ANKLE COMPLETE 3 VIEW RIGHT: ICD-10-PCS | Mod: FY,RT,S$GLB, | Performed by: RADIOLOGY

## 2023-11-20 PROCEDURE — 73630 XR FOOT COMPLETE 3 VIEW RIGHT: ICD-10-PCS | Mod: FY,RT,S$GLB, | Performed by: RADIOLOGY

## 2023-11-20 PROCEDURE — 99203 PR OFFICE/OUTPT VISIT, NEW, LEVL III, 30-44 MIN: ICD-10-PCS | Mod: S$GLB,,, | Performed by: PHYSICIAN ASSISTANT

## 2023-11-20 PROCEDURE — 73630 X-RAY EXAM OF FOOT: CPT | Mod: FY,RT,S$GLB, | Performed by: RADIOLOGY

## 2023-11-20 PROCEDURE — 99203 OFFICE O/P NEW LOW 30 MIN: CPT | Mod: S$GLB,,, | Performed by: PHYSICIAN ASSISTANT

## 2023-11-20 PROCEDURE — 73610 X-RAY EXAM OF ANKLE: CPT | Mod: FY,RT,S$GLB, | Performed by: RADIOLOGY

## 2023-11-20 RX ORDER — IBUPROFEN 800 MG/1
800 TABLET ORAL EVERY 8 HOURS PRN
Qty: 20 TABLET | Refills: 0 | Status: SHIPPED | OUTPATIENT
Start: 2023-11-20 | End: 2023-11-20

## 2023-11-20 RX ORDER — NAPROXEN 500 MG/1
500 TABLET ORAL 2 TIMES DAILY WITH MEALS
Qty: 20 TABLET | Refills: 0 | Status: SHIPPED | OUTPATIENT
Start: 2023-11-20 | End: 2023-11-28 | Stop reason: SDUPTHER

## 2023-11-20 RX ORDER — NAPROXEN 500 MG/1
500 TABLET ORAL 2 TIMES DAILY WITH MEALS
Qty: 20 TABLET | Refills: 0 | Status: SHIPPED | OUTPATIENT
Start: 2023-11-20 | End: 2023-11-20 | Stop reason: SDUPTHER

## 2023-11-20 NOTE — LETTER
[facility]  [address]  [place] [address]  Phone: [phone]  Fax: [phone]  [facility]: [email or web address]    Pt Name: Devendra Ahn  Injury Date: 11/20/2023   Employee ID: [id number] Date of First Treatment: 11/20/2023   Company: [id number]      Appointment Time: 02:55 PM Arrived: 3:10 pm   Provider: Pernell Haynes PA-C Time Out:5:03 pm     Office Treatment:   1. Sprain of right ankle, unspecified ligament, initial encounter    2. Right foot injury, initial encounter    3. Right ankle injury, initial encounter    4. Work related injury                     Return Appointment: follow up with occupational medicine tomorrow.   - Call [email or web address] to schedule  - rest, ice, elevate, otc ibuprofen as directed, ace wrap

## 2023-11-20 NOTE — PROGRESS NOTES
"Subjective:      Patient ID: Devendra Ahn is a 33 y.o. male.    Vitals:  height is 5' 8" (1.727 m) and weight is 108.9 kg (240 lb). His temperature is 98.5 °F (36.9 °C). His blood pressure is 133/97 (abnormal) and his pulse is 78. His respiration is 20 and oxygen saturation is 97%.     Chief Complaint: Foot Injury    Patient's place of employment - [employer]  Patient's job title - /package delivery  Date of injury - 11/20/2023 approximately 12:30 p.m.  Body part injured including left or right - right foot and right ankle  Injury Mechanism - states that his foot slipped from a step and twisted his ankle and foot.   Pain scale right now -  10    33-year-old male presents today for evaluation of right ankle/foot pain from injury.  Patient states that he was at work getting out of his work van when he went to step down and foot slipped on the step or missed the step which caused him to step down onto ground and he twisted ankle and injured it.  Patient states that there was immediate pain but he was able to walk on it.  Patient states that pain progressively worsened and now he is not able to bear any weight on it or walk on it.  No wounds to the area.  No prior major injury or surgery to this ankle in the past.    Foot Injury   The incident occurred 3 to 6 hours ago. The incident occurred at work. The injury mechanism was a fall. The pain is present in the right ankle and right foot. The quality of the pain is described as aching. The pain is at a severity of 10/10. The pain is severe. The pain has been Constant since onset. Associated symptoms include an inability to bear weight and a loss of sensation. Pertinent negatives include no loss of motion, muscle weakness, numbness or tingling. He reports no foreign bodies present. Nothing aggravates the symptoms. He has tried nothing for the symptoms.     Constitution: Negative for chills, sweating, fatigue and fever.   HENT:  Negative for ear pain, congestion and sore " throat.    Neck: Negative for neck pain and neck stiffness.   Cardiovascular:  Negative for chest pain, leg swelling, palpitations and sob on exertion.   Eyes:  Negative for eye itching, eye pain and eye redness.   Respiratory:  Negative for cough, sputum production and shortness of breath.    Gastrointestinal:  Negative for abdominal pain, nausea, vomiting and diarrhea.   Genitourinary:  Negative for dysuria, frequency, urgency, flank pain and hematuria.   Musculoskeletal:  Positive for pain, trauma, joint pain, joint swelling, abnormal ROM of joint and pain with walking.   Skin:  Negative for color change and rash.   Neurological:  Negative for dizziness, passing out, headaches, disorientation, numbness, tingling and seizures.   Psychiatric/Behavioral:  Negative for disorientation.       Objective:     Physical Exam   Constitutional: He is oriented to person, place, and time. He appears well-developed.  Non-toxic appearance. He does not appear ill. No distress.   HENT:   Head: Normocephalic and atraumatic.   Ears:   Right Ear: External ear normal.   Left Ear: External ear normal.   Eyes: Conjunctivae are normal. Right eye exhibits no discharge. Left eye exhibits no discharge. No scleral icterus.   Cardiovascular:   Pulses:       Dorsalis pedis pulses are 2+ on the right side.        Posterior tibial pulses are 2+ on the right side.   Pulmonary/Chest: Effort normal. No stridor. No respiratory distress. He has no wheezes.   Musculoskeletal:        Feet:    Neurological: He is alert and oriented to person, place, and time.   Skin: Skin is not diaphoretic.   Psychiatric: His behavior is normal. Judgment and thought content normal.   Nursing note and vitals reviewed.      XR FOOT COMPLETE 3 VIEW RIGHT    Result Date: 11/20/2023  EXAMINATION: XR FOOT COMPLETE 3 VIEW RIGHT; XR ANKLE COMPLETE 3 VIEW RIGHT CLINICAL HISTORY: . Unspecified injury of right foot, initial encounter; Unspecified injury of right ankle, initial  encounter TECHNIQUE: AP, lateral, and oblique views of the right foot and right ankle were performed. COMPARISON: None FINDINGS: Bones are well mineralized. Overall alignment is within normal limits.  Nonspecific soft tissue swelling overlying the lateral malleolus and anteriorly.  Ankle mortise is otherwise intact.  Lisfranc articulation is congruent.  No displaced fracture, dislocation or destructive osseous process. Joint spaces appear relatively maintained. No subcutaneous emphysema or radiodense retained foreign body.     Anterolateral right ankle mild nonspecific soft tissue swelling without acute displaced fracture-dislocation identified, which may represent sprain. Electronically signed by: Edgar Diez MD Date:    11/20/2023 Time:    16:56    XR ANKLE COMPLETE 3 VIEW RIGHT    Result Date: 11/20/2023  EXAMINATION: XR FOOT COMPLETE 3 VIEW RIGHT; XR ANKLE COMPLETE 3 VIEW RIGHT CLINICAL HISTORY: . Unspecified injury of right foot, initial encounter; Unspecified injury of right ankle, initial encounter TECHNIQUE: AP, lateral, and oblique views of the right foot and right ankle were performed. COMPARISON: None FINDINGS: Bones are well mineralized. Overall alignment is within normal limits.  Nonspecific soft tissue swelling overlying the lateral malleolus and anteriorly.  Ankle mortise is otherwise intact.  Lisfranc articulation is congruent.  No displaced fracture, dislocation or destructive osseous process. Joint spaces appear relatively maintained. No subcutaneous emphysema or radiodense retained foreign body.     Anterolateral right ankle mild nonspecific soft tissue swelling without acute displaced fracture-dislocation identified, which may represent sprain. Electronically signed by: Edgar Diez MD Date:    11/20/2023 Time:    16:56     Assessment:     1. Sprain of right ankle, unspecified ligament, initial encounter    2. Right foot injury, initial encounter    3. Right ankle injury, initial encounter    4.  Work related injury        Plan:   I reviewed x-rays with patient, no apparent fracture.  Ace bandage placed in clinic.  Instructed RICE, he has crutches at home.  Follow up with occupational health tomorrow.  - Discussed ddx, home care, tx options, and given follow up precautions.  I have reviewed the patient's chart to view previous visits, labs, and imaging to assess PMH and look for any trends or previous treatments.    ADDENDUM:  Patient called stating that he is in severe pain and would like prescription.  Ibuprofen OTC as not helps.  Recommended OTC options and patient declined and is requesting prescription medication, naproxen sent to pharmacy.  Sprain of right ankle, unspecified ligament, initial encounter  -     Ambulatory referral/consult to Occupational Medicine  -     BANDAGE ELASTIC 4IN ACE NS  -     Discontinue: ibuprofen (ADVIL,MOTRIN) 800 MG tablet; Take 1 tablet (800 mg total) by mouth every 8 (eight) hours as needed for Pain.  Dispense: 20 tablet; Refill: 0  -     naproxen (NAPROSYN) 500 MG tablet; Take 1 tablet (500 mg total) by mouth 2 (two) times daily with meals. for 10 days  Dispense: 20 tablet; Refill: 0    Right foot injury, initial encounter  -     XR FOOT COMPLETE 3 VIEW RIGHT; Future; Expected date: 11/20/2023  -     Ambulatory referral/consult to Occupational Medicine    Right ankle injury, initial encounter  -     XR ANKLE COMPLETE 3 VIEW RIGHT; Future; Expected date: 11/20/2023  -     Ambulatory referral/consult to Occupational Medicine    Work related injury  -     Ambulatory referral/consult to Occupational Medicine      Patient Instructions   - Rest.    - Drink plenty of fluids.      - take naproxen twice a day as prescribed with food to help with pain and inflammation.  Do not take over the counter NSAIDs (aleve, advil, ibuprofen) while taking this medication.  You can take Tylenol as directed along with this medication for pain relief.    - Ice for 15-20 minutes at a time for  the next 24-48 hours.    - Elevate when possible.     - Follow up with your PCP or specialty clinic as directed in the next 1-2 weeks if not improved or as needed.  You can call [phone] to schedule an appointment with the appropriate provider.    - Go to the ER or seek medical attention immediately if you develop new or worsening symptoms.     - You must understand that you have received an Urgent Care treatment only and that you may be released before all of your medical problems are known or treated.   - You, the patient, will arrange for follow up care as instructed.   - If your condition worsens or fails to improve we recommend that you receive another evaluation at the ER immediately or contact your PCP to discuss your concerns or return here.    Elevated Blood Pressure  Your blood pressure was elevated during your visit to the urgent care.  It was not so high that immediate care was needed but it is recommended that you monitor your blood pressure over the next week or two to make sure that it is not staying elevated.  Please have your blood pressure taken 2-3 times daily at different times of the day.  Write all of those blood pressures down and record the time that they were taken.  Keep all that information and take it with you to see your Primary Care Physician.  If your blood pressure is consistently above 140/90 you will need to follow up with your PCP more quickly

## 2023-11-20 NOTE — PATIENT INSTRUCTIONS
- Rest.    - Drink plenty of fluids.      - take naproxen twice a day as prescribed with food to help with pain and inflammation.  Do not take over the counter NSAIDs (aleve, advil, ibuprofen) while taking this medication.  You can take Tylenol as directed along with this medication for pain relief.    - Ice for 15-20 minutes at a time for the next 24-48 hours.    - Elevate when possible.     - Follow up with your PCP or specialty clinic as directed in the next 1-2 weeks if not improved or as needed.  You can call [phone] to schedule an appointment with the appropriate provider.    - Go to the ER or seek medical attention immediately if you develop new or worsening symptoms.     - You must understand that you have received an Urgent Care treatment only and that you may be released before all of your medical problems are known or treated.   - You, the patient, will arrange for follow up care as instructed.   - If your condition worsens or fails to improve we recommend that you receive another evaluation at the ER immediately or contact your PCP to discuss your concerns or return here.    Elevated Blood Pressure  Your blood pressure was elevated during your visit to the urgent care.  It was not so high that immediate care was needed but it is recommended that you monitor your blood pressure over the next week or two to make sure that it is not staying elevated.  Please have your blood pressure taken 2-3 times daily at different times of the day.  Write all of those blood pressures down and record the time that they were taken.  Keep all that information and take it with you to see your Primary Care Physician.  If your blood pressure is consistently above 140/90 you will need to follow up with your PCP more quickly

## 2023-11-21 ENCOUNTER — OFFICE VISIT (OUTPATIENT)
Dept: URGENT CARE | Facility: CLINIC | Age: 33
End: 2023-11-21
Payer: OTHER MISCELLANEOUS

## 2023-11-21 VITALS
RESPIRATION RATE: 20 BRPM | OXYGEN SATURATION: 98 % | HEIGHT: 67 IN | DIASTOLIC BLOOD PRESSURE: 104 MMHG | HEART RATE: 104 BPM | SYSTOLIC BLOOD PRESSURE: 159 MMHG | BODY MASS INDEX: 37.67 KG/M2 | WEIGHT: 240 LBS

## 2023-11-21 DIAGNOSIS — Z02.6 ENCOUNTER RELATED TO WORKER'S COMPENSATION CLAIM: Primary | ICD-10-CM

## 2023-11-21 DIAGNOSIS — S99.911D INJURY OF RIGHT ANKLE, SUBSEQUENT ENCOUNTER: ICD-10-CM

## 2023-11-21 DIAGNOSIS — S93.401D SPRAIN OF RIGHT ANKLE, UNSPECIFIED LIGAMENT, SUBSEQUENT ENCOUNTER: ICD-10-CM

## 2023-11-21 PROCEDURE — 99213 PR OFFICE/OUTPT VISIT, EST, LEVL III, 20-29 MIN: ICD-10-PCS | Mod: S$GLB,,, | Performed by: SURGERY

## 2023-11-21 PROCEDURE — 99213 OFFICE O/P EST LOW 20 MIN: CPT | Mod: S$GLB,,, | Performed by: SURGERY

## 2023-11-21 RX ORDER — CYCLOBENZAPRINE HCL 10 MG
10 TABLET ORAL NIGHTLY
Qty: 15 TABLET | Refills: 0 | Status: SHIPPED | OUTPATIENT
Start: 2023-11-21 | End: 2023-12-06

## 2023-11-21 RX ORDER — METHYLPREDNISOLONE 4 MG/1
TABLET ORAL
Qty: 21 EACH | Refills: 0 | Status: SHIPPED | OUTPATIENT
Start: 2023-11-21 | End: 2023-12-05 | Stop reason: ALTCHOICE

## 2023-11-21 NOTE — TELEPHONE ENCOUNTER
Spoke with patient regarding prescription concerns. Pt stated that he is in extreme pain with his foot/ankle and asked if the provider that he saw today (Pernell Haynes) can send a prescription for pain. Advised patient that we do not wrote prescriptions for narcotics but we can send something other than ibuprofen. Pt did understand and is ok with either getting Celebrex or naproxen as the new prescription. Patient will call if he has any more concerns or questions.

## 2023-11-21 NOTE — PROGRESS NOTES
Subjective:      Patient ID: Devendra Ahn is a 33 y.o. male.    Chief Complaint: Ankle Injury (RT Ankle/Foot)    Patient's place of employment - [employer]  Patient's job title -   Date of injury -11-20-23   Body part injured including left or right - RT Ankle/Foot  Injury Mechanism - Stepped off Van step and twisted RT Foot/Ankle  What they were doing when they got hurt - finished loading Van and was returning cart to building  What they did immediately after - [facility] [address]  Pain scale right now - 10/10    Musculoskeletal:  Positive for pain, joint pain, joint swelling, abnormal ROM of joint, pain with walking and muscle ache. Negative for trauma and muscle cramps.   Skin:  Positive for bruising. Negative for erythema.   Neurological:  Positive for numbness and tingling.     See MA note above. Begin MD note:    Devendra Ahn is a 33 y.o. male presenting for follow-up of right ankle/foot injury. His right foot slipped as he was getting out of the van and hit the ground. Pain progressively worsened until he was unable to bear weight. He presented to  and xray imaging was negative for fracture, he was discharged with [facility] referral and naproxen. He was also given compressive dressing and crutches. He reports that he did RICE at home. He had difficulty falling asleep last night due to pain with movement. He has pain with wiggling his toes, attempting to bear weight on the right foot, and dorsiflexion. He has been taken the prescribed naproxen TID as he notes increasing pain within 6 hours. He denies any history of RLE injury or trauma. He is only on thyroid medication and the medication given to him at . He denies any tobacco use.     Objective:     Physical Exam  Vitals and nursing note reviewed.   HENT:      Head: Normocephalic.   Eyes:      Conjunctiva/sclera: Conjunctivae normal.   Pulmonary:      Effort: Pulmonary effort is normal.   Musculoskeletal:      Right ankle:  Swelling present. Tenderness present over the medial malleolus, ATF ligament and AITF ligament. No base of 5th metatarsal or proximal fibula tenderness. Decreased range of motion. Anterior drawer test negative.      Left ankle: Normal.      Comments: Grossly swollen foot. TTP anterior to both the medial and lateral malleolus. Pain with ROM in all planes. Neurovascularly intact.    Skin:     Findings: No erythema.   Neurological:      General: No focal deficit present.      Mental Status: He is alert and oriented to person, place, and time.      Motor: Motor function is intact.      Coordination: Coordination is intact.   Psychiatric:         Attention and Perception: Attention normal.         Mood and Affect: Mood normal.         Speech: Speech normal.         Behavior: Behavior normal. Behavior is cooperative.         Thought Content: Thought content normal.        Imaging  XR FOOT COMPLETE 3 VIEW RIGHT    Result Date: 11/20/2023  EXAMINATION: XR FOOT COMPLETE 3 VIEW RIGHT; XR ANKLE COMPLETE 3 VIEW RIGHT CLINICAL HISTORY: . Unspecified injury of right foot, initial encounter; Unspecified injury of right ankle, initial encounter TECHNIQUE: AP, lateral, and oblique views of the right foot and right ankle were performed. COMPARISON: None FINDINGS: Bones are well mineralized. Overall alignment is within normal limits.  Nonspecific soft tissue swelling overlying the lateral malleolus and anteriorly.  Ankle mortise is otherwise intact.  Lisfranc articulation is congruent.  No displaced fracture, dislocation or destructive osseous process. Joint spaces appear relatively maintained. No subcutaneous emphysema or radiodense retained foreign body.     Anterolateral right ankle mild nonspecific soft tissue swelling without acute displaced fracture-dislocation identified, which may represent sprain. Electronically signed by: Edgar Diez MD Date:    11/20/2023 Time:    16:56    XR ANKLE COMPLETE 3 VIEW RIGHT    Result Date:  11/20/2023  EXAMINATION: XR FOOT COMPLETE 3 VIEW RIGHT; XR ANKLE COMPLETE 3 VIEW RIGHT CLINICAL HISTORY: . Unspecified injury of right foot, initial encounter; Unspecified injury of right ankle, initial encounter TECHNIQUE: AP, lateral, and oblique views of the right foot and right ankle were performed. COMPARISON: None FINDINGS: Bones are well mineralized. Overall alignment is within normal limits.  Nonspecific soft tissue swelling overlying the lateral malleolus and anteriorly.  Ankle mortise is otherwise intact.  Lisfranc articulation is congruent.  No displaced fracture, dislocation or destructive osseous process. Joint spaces appear relatively maintained. No subcutaneous emphysema or radiodense retained foreign body.     Anterolateral right ankle mild nonspecific soft tissue swelling without acute displaced fracture-dislocation identified, which may represent sprain. Electronically signed by: Edgar Diez MD Date:    11/20/2023 Time:    16:56     Assessment:      1. Encounter related to worker's compensation claim    2. Sprain of right ankle, unspecified ligament, subsequent encounter    3. Injury of right ankle, subsequent encounter      Plan:     I reviewed the prior clinic note and imaging related to this injury. We are going to address the swelling with a steroid dosepack, he was instructed to discontinue naproxen use in the time being until completion of the steroid pack, it is okay to use tylenol. He may resume using naproxen, only BID, the day after he completes the steroid dosepack. I will also provide muscle relaxer to alleviate some discomfort at night. He will continue to RICE. An ankle immobilizer was provided. Work restrictions were given. We will follow-up in 1 week, he was advised to contact [facility] sooner if any issues.     Diagnoses and plan discussed with the patient, as well as the expected course and duration of symptoms. Risks and benefits of any medication prescribed during this visit was  explained, verbal instructions on use given. Clinic/Emergency department return precautions were given, can return to [facility] before scheduled follow-up appointment if notes worsening/aggravation of symptoms. All questions and concerns were addressed prior to discharge. Plan was developed with active input from the patient and they verbalized understanding of and agreement with the POC.   Note was dictated with voice recognition software, please excuse any grammatical errors.    Medications Ordered This Encounter   Medications    cyclobenzaprine (FLEXERIL) 10 MG tablet     Sig: Take 1 tablet (10 mg total) by mouth every evening. for 15 days     Dispense:  15 tablet     Refill:  0    methylPREDNISolone (MEDROL DOSEPACK) 4 mg tablet     Sig: use as directed     Dispense:  21 each     Refill:  0     Patient Instructions: Use Crutches as directed, Apply ice 24-48 hours then apply heat/warm soaks, Elevated affected area, Attention not to aggravate affected area   Restrictions: No driving company vehicles, No Prolonged standing/walking, Sit or stand as needed  Follow up in about 1 week (around 11/28/2023).

## 2023-11-21 NOTE — TELEPHONE ENCOUNTER
----- Message from Yaquelin Ball sent at 11/20/2023  6:46 PM CST -----  Contact: [phone]  Patient is calling about prescriptions.

## 2023-11-21 NOTE — LETTER
[facility]  [address] [address]  Phone: [phone]  Fax: [phone]  [facility]: [email or web address]    Pt Name: Devendra Ahn  Injury Date: 11/20/2023   Employee ID: [id number] Date of First Treatment: 11/21/2023   Company: [employer]      Appointment Time: 11:30 AM Arrived: 11;18 AM    Provider: Leatha Abel MD Time Out:12:51 PM     Office Treatment:   1. Encounter related to worker's compensation claim    2. Sprain of right ankle, unspecified ligament, subsequent encounter    3. Injury of right ankle, subsequent encounter      Medications Ordered This Encounter   Medications    cyclobenzaprine (FLEXERIL) 10 MG tablet    methylPREDNISolone (MEDROL DOSEPACK) 4 mg tablet        Patient Instructions: Use Crutches as directed, Apply ice 24-48 hours then apply heat/warm soaks, Elevated affected area, Attention not to aggravate affected area      Restrictions: No driving company vehicles, No Prolonged standing/walking, Sit or stand as needed     Return Appointment: 11/28/2023 at 11:15 AM [id number]

## 2023-11-28 ENCOUNTER — OFFICE VISIT (OUTPATIENT)
Dept: URGENT CARE | Facility: CLINIC | Age: 33
End: 2023-11-28
Payer: OTHER MISCELLANEOUS

## 2023-11-28 VITALS
OXYGEN SATURATION: 97 % | HEIGHT: 67 IN | SYSTOLIC BLOOD PRESSURE: 138 MMHG | RESPIRATION RATE: 18 BRPM | WEIGHT: 240 LBS | HEART RATE: 129 BPM | BODY MASS INDEX: 37.67 KG/M2 | DIASTOLIC BLOOD PRESSURE: 98 MMHG

## 2023-11-28 DIAGNOSIS — S99.911D INJURY OF RIGHT ANKLE, SUBSEQUENT ENCOUNTER: ICD-10-CM

## 2023-11-28 DIAGNOSIS — Z02.6 ENCOUNTER RELATED TO WORKER'S COMPENSATION CLAIM: Primary | ICD-10-CM

## 2023-11-28 DIAGNOSIS — S93.401D SPRAIN OF RIGHT ANKLE, UNSPECIFIED LIGAMENT, SUBSEQUENT ENCOUNTER: ICD-10-CM

## 2023-11-28 PROCEDURE — 99213 OFFICE O/P EST LOW 20 MIN: CPT | Mod: S$GLB,,, | Performed by: SURGERY

## 2023-11-28 PROCEDURE — 99213 PR OFFICE/OUTPT VISIT, EST, LEVL III, 20-29 MIN: ICD-10-PCS | Mod: S$GLB,,, | Performed by: SURGERY

## 2023-11-28 RX ORDER — NAPROXEN 500 MG/1
500 TABLET ORAL 2 TIMES DAILY WITH MEALS
Qty: 20 TABLET | Refills: 0 | Status: SHIPPED | OUTPATIENT
Start: 2023-11-28 | End: 2023-12-08

## 2023-11-28 NOTE — PROGRESS NOTES
Subjective:      Patient ID: Devendra Ahn is a 33 y.o. male.    Chief Complaint: Foot Injury    Patient's place of employment - [employer]  Patient's job title -   Date of injury -11-20-23   Body part injured - RT Ankle/Foot  Current work status per last visit - out on leave  Improved, same, or worse - same with mild improvement; feeling better than last week.  Pain Scale right now (1-10) -  5/10    KW      Musculoskeletal:  Positive for pain, joint pain, joint swelling, abnormal ROM of joint, pain with walking and muscle ache. Negative for trauma and muscle cramps.   Skin:  Positive for bruising. Negative for erythema.   Neurological:  Positive for numbness and tingling.       See MA note above. Begin MD note:    Devendra Ahn is a 33 y.o. male presenting for follow-up of right foot/ankle injury. Some improvement from last time, able to bear weight better but still requiring crutches and pain with moving toes. Sleeping better with use of muscle relaxer. Completed steroid without issue. Has noticed some numbness and tingling with foot while elevating. Has not returned to work as no available modified duty.     Objective:     Physical Exam  Vitals and nursing note reviewed.   HENT:      Head: Normocephalic.   Eyes:      Conjunctiva/sclera: Conjunctivae normal.   Pulmonary:      Effort: Pulmonary effort is normal.   Musculoskeletal:      Right ankle: Swelling and ecchymosis present. Tenderness present over the medial malleolus, ATF ligament and AITF ligament. No base of 5th metatarsal or proximal fibula tenderness. Decreased range of motion. Anterior drawer test negative.      Left ankle: Normal.      Comments: Ambulating with crutches, ankle brace in place. Swelling improved from prior still prominent over the dorsum of foot. Now resolving ecchymosis appreciated on lateral foot and medial ankle (see media). Pain with active ROM, dorsiflexion elicits worst pain. Neurovascularly intact.    Skin:      Findings: No erythema.   Neurological:      General: No focal deficit present.      Mental Status: He is alert and oriented to person, place, and time.      Motor: Motor function is intact.      Coordination: Coordination is intact.   Psychiatric:         Attention and Perception: Attention normal.         Mood and Affect: Mood normal.         Speech: Speech normal.         Behavior: Behavior normal. Behavior is cooperative.         Thought Content: Thought content normal.            Assessment:      1. Encounter related to worker's compensation claim    2. Sprain of right ankle, unspecified ligament, subsequent encounter    3. Injury of right ankle, subsequent encounter      Plan:     Minor interval improvements from prior noted on exam. He will restart Naproxen use, okay to supplement with tylenol, and continue muscle relaxer as well as RICE. Ankle boot was placed in clinic, he was counseled to remove while sitting at home with leg elevated, otherwise use to ambulate. Follow-up in 1 week, if persistent swelling and decreased ROM will get further imaging prior to initiating PT.     Diagnoses and plan discussed with the patient, as well as the expected course and duration of symptoms. Risks and benefits of any medication prescribed during this visit was explained, verbal instructions on use given. Clinic/Emergency department return precautions were given, can return to [facility] before scheduled follow-up appointment if notes worsening/aggravation of symptoms. All questions and concerns were addressed prior to discharge. Plan was developed with active input from the patient and they verbalized understanding of and agreement with the POC.   Note was dictated with voice recognition software, please excuse any grammatical errors.    Medications Ordered This Encounter   Medications    naproxen (NAPROSYN) 500 MG tablet     Sig: Take 1 tablet (500 mg total) by mouth 2 (two) times daily with meals. for 10 days      Dispense:  20 tablet     Refill:  0     Patient Instructions: Use Crutches as directed, Use splint as directed, Elevated affected area   Restrictions: No driving company vehicles, Sit down work only  Follow up in about 1 week (around 12/5/2023).

## 2023-12-05 ENCOUNTER — OFFICE VISIT (OUTPATIENT)
Dept: URGENT CARE | Facility: CLINIC | Age: 33
End: 2023-12-05
Payer: OTHER MISCELLANEOUS

## 2023-12-05 VITALS
HEIGHT: 67 IN | RESPIRATION RATE: 17 BRPM | DIASTOLIC BLOOD PRESSURE: 104 MMHG | OXYGEN SATURATION: 97 % | WEIGHT: 240 LBS | BODY MASS INDEX: 37.67 KG/M2 | TEMPERATURE: 98 F | SYSTOLIC BLOOD PRESSURE: 140 MMHG | HEART RATE: 86 BPM

## 2023-12-05 DIAGNOSIS — S93.401D SPRAIN OF RIGHT ANKLE, UNSPECIFIED LIGAMENT, SUBSEQUENT ENCOUNTER: Primary | ICD-10-CM

## 2023-12-05 DIAGNOSIS — S99.911D INJURY OF RIGHT ANKLE, SUBSEQUENT ENCOUNTER: ICD-10-CM

## 2023-12-05 DIAGNOSIS — Z02.6 ENCOUNTER RELATED TO WORKER'S COMPENSATION CLAIM: ICD-10-CM

## 2023-12-05 PROCEDURE — 99213 OFFICE O/P EST LOW 20 MIN: CPT | Mod: S$GLB,,, | Performed by: SURGERY

## 2023-12-05 PROCEDURE — 99213 PR OFFICE/OUTPT VISIT, EST, LEVL III, 20-29 MIN: ICD-10-PCS | Mod: S$GLB,,, | Performed by: SURGERY

## 2023-12-05 NOTE — PROGRESS NOTES
Subjective:      Patient ID: Devendra Ahn is a 33 y.o. male.    Chief Complaint: Foot Injury (Right) and Ankle Injury (Right)    Patient's place of employment - [employer]  Patient's job title -   Date of injury - 11/20/23  Body part injured - Right Foot/Ankle  Current work status per last visit - out on leave  Improved, same, or worse - Improved   Pain Scale right now (1-10) - 3/10 with the boot 5/10 without the boot     EC        Constitution: Positive for activity change and generalized weakness.   Musculoskeletal:  Positive for pain, joint pain, abnormal ROM of joint and pain with walking. Negative for joint swelling, muscle cramps and muscle ache.   Skin:  Positive for color change.   Neurological:  Negative for loss of balance, numbness and tingling.   Psychiatric/Behavioral:  Negative for sleep disturbance.      See MA note above. Begin MD note:    Devendra Ahn is a 33 y.o. male presenting for follow-up of right foot/ankle injury. Pain is improved, still has twinges with dorsi and plantarflexion of foot, but no pain with moving left to right. He is attempting to remove the boot more often while at home to place weight on his right foot gradually, he feels this is getting better. Continues to take medications as prescribed.     Objective:     Physical Exam  Vitals and nursing note reviewed.   HENT:      Head: Normocephalic.   Eyes:      Conjunctiva/sclera: Conjunctivae normal.   Pulmonary:      Effort: Pulmonary effort is normal.   Musculoskeletal:      Right ankle: Ecchymosis present. Tenderness present over the CF ligament. No lateral malleolus or medial malleolus tenderness. Decreased range of motion. Anterior drawer test negative.      Right Achilles Tendon: No tenderness or defects.        Feet:       Comments: Ankle boot in place, ambulating without crutches. Swelling is improved from prior, ecchymosis continues to gradually resolve, over the lateral aspect of dorsum of foot and  medial side of foot. No pain with active eversion or inversion, report of anterior tibial pain with plantar and dorsiflexion. TTP of the posteroinferior aspect of lateral malleolus. TTP of the mid-plantar aspect of foot.    Neurological:      General: No focal deficit present.      Mental Status: He is alert and oriented to person, place, and time.      Motor: Motor function is intact.      Coordination: Coordination is intact.   Psychiatric:         Attention and Perception: Attention normal.         Mood and Affect: Mood normal.         Speech: Speech normal.         Behavior: Behavior normal. Behavior is cooperative.         Thought Content: Thought content normal.        Assessment:      1. Sprain of right ankle, unspecified ligament, subsequent encounter    2. Encounter related to worker's compensation claim    3. Injury of right ankle, subsequent encounter      Plan:     Subjective and objective improvements noted. Now that swelling and pain is better controlled, Devendra would benefit from initiating PT. I advised him to wean naproxen use and once done can use OTC NSAID prn, he should take meds prior to and potentially following PT as he will likely note increased pain with first few sessions of PT. Work restrictions are placed. Follow-up in 2 weeks to assess PT progress.     Diagnoses and plan discussed with the patient, as well as the expected course and duration of symptoms. Risks and benefits of any medication prescribed during this visit was explained, verbal instructions on use given. Clinic/Emergency department return precautions were given, can return to [facility] before scheduled follow-up appointment if notes worsening/aggravation of symptoms. All questions and concerns were addressed prior to discharge. Plan was developed with active input from the patient and they verbalized understanding of and agreement with the POC.   Note was dictated with voice recognition software, please excuse any  grammatical errors.     Patient Instructions: Begin Physical Therapy, PT to be scheduled once authorized   Restrictions: No driving company vehicles, Sit down work only (RTW 12/5/23)  Follow up in about 15 days (around 12/20/2023).

## 2023-12-05 NOTE — LETTER
[facility] Occupational Health  [address] [address]  Phone: [phone]  Fax: [phone]  [facility] [employer] [facility]: [id number]    Pt Name: Devendra Ahn  Injury Date: 11/20/2023   Employee ID: [id number] Date of Treatment: 12/05/2023   Company: [employer]      Appointment Time: 11:00 AM Arrived: 10:40 AM    Provider: Leatha Abel MD Time Out:11:33 AM      Office Treatment:   1. Sprain of right ankle, unspecified ligament, subsequent encounter    2. Encounter related to worker's compensation claim    3. Injury of right ankle, subsequent encounter          Patient Instructions: Begin Physical Therapy, PT to be scheduled once authorized      Restrictions: No driving company vehicles, Sit down work only (RTW 12/5/23)     Return Appointment: 12/20/2023 at 11:30 AM

## 2023-12-20 ENCOUNTER — OFFICE VISIT (OUTPATIENT)
Dept: URGENT CARE | Facility: CLINIC | Age: 33
End: 2023-12-20
Payer: OTHER MISCELLANEOUS

## 2023-12-20 VITALS
BODY MASS INDEX: 37.67 KG/M2 | HEIGHT: 67 IN | WEIGHT: 240 LBS | HEART RATE: 85 BPM | DIASTOLIC BLOOD PRESSURE: 97 MMHG | TEMPERATURE: 98 F | OXYGEN SATURATION: 99 % | SYSTOLIC BLOOD PRESSURE: 126 MMHG | RESPIRATION RATE: 18 BRPM

## 2023-12-20 DIAGNOSIS — S93.401D SPRAIN OF RIGHT ANKLE, UNSPECIFIED LIGAMENT, SUBSEQUENT ENCOUNTER: Primary | ICD-10-CM

## 2023-12-20 DIAGNOSIS — Z02.6 ENCOUNTER RELATED TO WORKER'S COMPENSATION CLAIM: ICD-10-CM

## 2023-12-20 PROCEDURE — 99213 OFFICE O/P EST LOW 20 MIN: CPT | Mod: S$GLB,,, | Performed by: STUDENT IN AN ORGANIZED HEALTH CARE EDUCATION/TRAINING PROGRAM

## 2023-12-20 PROCEDURE — 99213 PR OFFICE/OUTPT VISIT, EST, LEVL III, 20-29 MIN: ICD-10-PCS | Mod: S$GLB,,, | Performed by: STUDENT IN AN ORGANIZED HEALTH CARE EDUCATION/TRAINING PROGRAM

## 2023-12-20 NOTE — PROGRESS NOTES
"Subjective:      Patient ID: Devendra Ahn is a 33 y.o. male.    Chief Complaint: Foot Injury (DOI: 11/20/23; Rt Ankle/Foot)    Patient's place of employment - [employer]  Patient's job title -   Date of injury - 11/20/23  Body part injured - Right Foot/Ankle  Current work status per last visit - out on leave  Improved, same, or worse - Improved; "only thing is there still a little bruising on the top of my foot other than that, no pain with walking or bearing weight"  Pain Scale right now (1-10) - 0/10    See MA note above. Begin MD note:  Devendra says his pain has completely resolved.  He has discontinued wear of the walking boot due to complete resolution in his symptoms.  He does note that he has bruising that is fading and no longer painful. He states that he is still out of work per his last work restriction letter.  However, he states that he feels ready and able to perform his job duties.  He denies numbness, tingling or new symptoms.          Musculoskeletal:  Negative for pain, trauma, joint pain, joint swelling, abnormal ROM of joint, arthritis, gout, back pain, pain with walking, muscle cramps, muscle ache and history of spine disorder.   Skin:  Negative for erythema.   Neurological:  Negative for numbness and tingling.     Objective:     Physical Exam  Vitals and nursing note reviewed.   Constitutional:       General: He is not in acute distress.     Appearance: Normal appearance. He is not ill-appearing.   HENT:      Head: Normocephalic.   Eyes:      Conjunctiva/sclera: Conjunctivae normal.   Pulmonary:      Effort: No respiratory distress.   Musculoskeletal:      Right ankle: Normal.      Right Achilles Tendon: Normal.      Right foot: Normal.      Comments: Fading bruise to lateral right foot.  Nontender.  Normal strength, range of motion, and ambulation.   Skin:     General: Skin is warm and dry.      Findings: No erythema.   Neurological:      Mental Status: He is alert and oriented " to person, place, and time.      GCS: GCS eye subscore is 4. GCS verbal subscore is 5. GCS motor subscore is 6.      Coordination: Coordination normal.      Gait: Gait normal.   Psychiatric:         Attention and Perception: Attention normal.         Mood and Affect: Mood normal.         Behavior: Behavior normal.        Assessment:      1. Sprain of right ankle, unspecified ligament, subsequent encounter    2. Encounter related to worker's compensation claim      Plan:     Esa reports complete resolution in his foot and ankle sprain.  He has a benign examination.  He has been cleared to resume working regular duty.  Patient declines need for follow-up at this time.  Informed that he may follow-up if needed if symptoms persist.  Discharged from occupational health today.           Restrictions: Regular Duty, Discharged from Occupational Health  Follow up if symptoms worsen or fail to improve.    I spent a total of 20 minutes on the day of the visit. This includes face to face time and non-face to face time preparing to see the patient (eg, review of tests, prior records/notes), obtaining and/or reviewing separately obtained history, documenting clinical information in the electronic or other health record, independently interpreting results and communicating results to the patient.

## 2023-12-20 NOTE — LETTER
[facility] Occupational Health  [address] [address]  Phone: [phone]  Fax: [phone]  [facility] [employer]: [id number]    Pt Name: Devendra Ahn  Injury Date: 11/20/2023   Employee ID: [id number] Date of Treatment: 12/20/2023   Company: [employer]      Appointment Time: 11:30 AM Arrived: 11:18 AM    Provider: Carmen Couch MD Time Out:11:59 AM      Office Treatment:   1. Sprain of right ankle, unspecified ligament, subsequent encounter    2. Encounter related to worker's compensation claim               Restrictions: Regular Duty, Discharged from Occupational Health     Return As needed. AUNDREA

## 2025-01-01 ENCOUNTER — HOSPITAL ENCOUNTER (EMERGENCY)
Facility: HOSPITAL | Age: 35
Discharge: HOME | End: 2025-01-01
Attending: EMERGENCY MEDICINE

## 2025-01-01 VITALS
HEART RATE: 91 BPM | HEIGHT: 68 IN | OXYGEN SATURATION: 96 % | BODY MASS INDEX: 39.4 KG/M2 | TEMPERATURE: 98 F | WEIGHT: 260 LBS | RESPIRATION RATE: 18 BRPM | SYSTOLIC BLOOD PRESSURE: 130 MMHG | DIASTOLIC BLOOD PRESSURE: 88 MMHG

## 2025-01-01 DIAGNOSIS — R05.9 COUGH: ICD-10-CM

## 2025-01-01 DIAGNOSIS — J40 BRONCHITIS: Primary | ICD-10-CM

## 2025-01-01 DIAGNOSIS — H10.9 CONJUNCTIVITIS OF BOTH EYES, UNSPECIFIED CONJUNCTIVITIS TYPE: ICD-10-CM

## 2025-01-01 LAB
CTP QC/QA: YES
CTP QC/QA: YES
POC MOLECULAR INFLUENZA A AGN: NEGATIVE
POC MOLECULAR INFLUENZA B AGN: NEGATIVE
SARS-COV-2 RDRP RESP QL NAA+PROBE: NEGATIVE

## 2025-01-01 PROCEDURE — 87635 SARS-COV-2 COVID-19 AMP PRB: CPT | Performed by: EMERGENCY MEDICINE

## 2025-01-01 PROCEDURE — 87502 INFLUENZA DNA AMP PROBE: CPT

## 2025-01-01 PROCEDURE — 99284 EMERGENCY DEPT VISIT MOD MDM: CPT | Mod: 25

## 2025-01-01 RX ORDER — AZITHROMYCIN 250 MG/1
TABLET, FILM COATED ORAL
Qty: 6 TABLET | Refills: 0 | Status: SHIPPED | OUTPATIENT
Start: 2025-01-01 | End: 2025-01-06

## 2025-01-01 RX ORDER — PROMETHAZINE HYDROCHLORIDE AND CODEINE PHOSPHATE 6.25; 1 MG/5ML; MG/5ML
5 SOLUTION ORAL NIGHTLY PRN
Qty: 50 ML | Refills: 0 | Status: SHIPPED | OUTPATIENT
Start: 2025-01-01 | End: 2025-01-11

## 2025-01-01 RX ORDER — GENTAMICIN SULFATE 3 MG/ML
2 SOLUTION/ DROPS OPHTHALMIC EVERY 4 HOURS
Qty: 5 ML | Refills: 0 | Status: SHIPPED | OUTPATIENT
Start: 2025-01-01

## 2025-01-01 NOTE — ED NOTES
Patient reports cough, sore throat, nasal congestion,  and generalized fatigue x 1 week, and pink eye x 4-5 days. Patient reports coughing up green sputum.

## 2025-01-01 NOTE — ED PROVIDER NOTES
Encounter Date: 1/1/2025       History     Chief Complaint   Patient presents with    Cough     Pt c/o cough and congestion x 1 week and pink eye x 4-5 days     Patient is a 34-year-old male who complains of a one-week history of congestion and a cough productive for green sputum.  He endorses chest pain but only when he coughs.  No shortness of breath.  He denies fever or chills.  He also complains of bilateral eye irritation and crusting on his eyelashes upon awakening this morning.  No visual changes.  He does not wear contact lens.      Review of patient's allergies indicates:  No Known Allergies  Past Medical History:   Diagnosis Date    Bipolar disorder, unspecified     Hypothyroidism, unspecified      History reviewed. No pertinent surgical history.  Family History   Problem Relation Name Age of Onset    No Known Problems Mother      No Known Problems Father       Social History     Tobacco Use    Smoking status: Never    Smokeless tobacco: Never   Substance Use Topics    Alcohol use: Yes     Alcohol/week: 3.0 standard drinks of alcohol     Types: 3 Shots of liquor per week    Drug use: Never     Review of Systems   Constitutional:  Negative for fever.   HENT:  Positive for congestion.    Eyes:  Positive for discharge.   Respiratory:  Positive for cough. Negative for shortness of breath.    Gastrointestinal:  Negative for abdominal pain, diarrhea and vomiting.   All other systems reviewed and are negative.      Physical Exam     Initial Vitals [01/01/25 0643]   BP Pulse Resp Temp SpO2   (!) 173/92 106 18 97.9 °F (36.6 °C) 96 %      MAP       --         Physical Exam    Nursing note and vitals reviewed.  Constitutional: No distress.   HENT:   Head: Normocephalic.   Eyes: EOM are normal. Pupils are equal, round, and reactive to light.   Mild bilateral conjunctival injection.  Scant crusting noted on upper eyelashes on right.   Neck: Neck supple.   Normal range of motion.  Cardiovascular:  Normal rate, regular  rhythm and normal heart sounds.           Pulmonary/Chest: Breath sounds normal.   Abdominal: Abdomen is soft. There is no rebound and no guarding.   Musculoskeletal:         General: Normal range of motion.      Cervical back: Normal range of motion and neck supple.     Neurological: He is alert and oriented to person, place, and time. No cranial nerve deficit.   Skin: Skin is warm and dry.   Psychiatric: He has a normal mood and affect. Thought content normal.         ED Course   Procedures  Labs Reviewed   SARS-COV-2 RDRP GENE       Result Value    POC Rapid COVID Negative       Acceptable Yes     POCT INFLUENZA A/B MOLECULAR    POC Molecular Influenza A Ag Negative      POC Molecular Influenza B Ag Negative       Acceptable Yes            Imaging Results              X-Ray Chest PA And Lateral (Final result)  Result time 01/01/25 07:52:17      Final result by Milton Figueroa MD (01/01/25 07:52:17)                   Impression:      No acute abnormality.      Electronically signed by: Milton Figueroa MD  Date:    01/01/2025  Time:    07:52               Narrative:    EXAMINATION:  XR CHEST PA AND LATERAL    CLINICAL HISTORY:  Cough, unspecified    TECHNIQUE:  PA and lateral views of the chest were performed.    COMPARISON:  None    FINDINGS:  The lungs are clear, with normal appearance of pulmonary vasculature.No pleural effusion.No pneumothorax.    The cardiac silhouette is normal in size. The hilar and mediastinal contours are unremarkable.    Bones are intact.                                       Medications - No data to display  Medical Decision Making  Emergent evaluation of a 34-year-old male with congestion and a productive cough.  Viral swabs are negative.  He will be placed on gentamicin drops for probable conjunctivitis as well as a Z-Buck and Phenergan with codeine for severe cough during the night.  He should follow up with the primary physician for recheck but may  return to the ED for any possible worsening.    Amount and/or Complexity of Data Reviewed  Labs: ordered.     Details: COVID-19 and influenza swabs are negative.  Radiology: ordered.     Details: Chest x-ray without acute cardiopulmonary abnormality.    Risk  Prescription drug management.                                      Clinical Impression:  Final diagnoses:  [R05.9] Cough  [J40] Bronchitis (Primary)  [H10.9] Conjunctivitis of both eyes, unspecified conjunctivitis type          ED Disposition Condition    Discharge Stable          ED Prescriptions       Medication Sig Dispense Start Date End Date Auth. Provider    azithromycin (Z-TYSHAWN) 250 MG tablet Take 2 tablets by mouth on day 1; Take 1 tablet by mouth on days 2-5 6 tablet 1/1/2025 1/6/2025 Danis Rico MD    promethazine-codeine 6.25-10 mg/5 ml (PHENERGAN WITH CODEINE) 6.25-10 mg/5 mL syrup Take 5 mLs by mouth nightly as needed for Cough. 50 mL 1/1/2025 1/11/2025 Danis Rico MD    gentamicin (GARAMYCIN) 0.3 % ophthalmic solution Place 2 drops into both eyes every 4 (four) hours. 5 mL 1/1/2025 -- Danis Rico MD          Follow-up Information       Follow up With Specialties Details Why Contact Info    Your primary doctor   As needed     [facility] Emergency Dept Emergency Medicine  If symptoms worsen [address] [id number] [phone]             Danis Rico MD  01/01/25 [id number]